# Patient Record
Sex: FEMALE | URBAN - METROPOLITAN AREA
[De-identification: names, ages, dates, MRNs, and addresses within clinical notes are randomized per-mention and may not be internally consistent; named-entity substitution may affect disease eponyms.]

---

## 2023-02-20 ENCOUNTER — HOSPITAL ENCOUNTER (OUTPATIENT)
Dept: TELEMEDICINE | Facility: HOSPITAL | Age: 68
Discharge: HOME | End: 2023-02-20

## 2023-02-20 DIAGNOSIS — R55 SYNCOPE, UNSPECIFIED SYNCOPE TYPE: ICD-10-CM

## 2023-02-20 PROCEDURE — G0425 PR INPT TELEHEALTH CONSULT 30M: ICD-10-PCS | Mod: GT,,, | Performed by: STUDENT IN AN ORGANIZED HEALTH CARE EDUCATION/TRAINING PROGRAM

## 2023-02-20 PROCEDURE — G0425 INPT/ED TELECONSULT30: HCPCS | Mod: GT,,, | Performed by: STUDENT IN AN ORGANIZED HEALTH CARE EDUCATION/TRAINING PROGRAM

## 2023-02-20 NOTE — ASSESSMENT & PLAN NOTE
"66 yo female w/ PMHx of R carotid dissection ( 20+ years ago) who presents w/ reported RUE and RLE drift.   Per report,  noted that patient had RUE "shaking" and confusion when he found her on the couch.   She states that prior to that she had an episode of LOC, unsure of how long she was out, while she was getting ready to go out.   Denies previous episodes such as this aside from the R carotid dissection history.   She is only taking vitamins at home.     NIHSS 0. Unable to review CTH myself as not in PACS.     Unclear etiology of syncopal episode at this time w/ subsequent RUE tremor and RUE and RLE weakness, that resolved by the time of the evaluation.     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    "

## 2023-02-20 NOTE — CONSULTS
"      [facility]  Vascular Neurology  Comprehensive Stroke Center  TeleVascular Neurology Acute Consultation Note      Consults    Consulting Provider: YOVANNY ROGERS  Current Providers  No providers found    [facility]  Spoke hospital nurse at bedside with patient assisting consultant.     Patient information was obtained from patient, spouse/SO, and ER records.         Assessment/Plan:       Diagnoses:   * Syncope  68 yo female w/ PMHx of R carotid dissection ( 20+ years ago) who presents w/ reported RUE and RLE drift.   Per report,  noted that patient had RUE "shaking" and confusion when he found her on the couch.   She states that prior to that she had an episode of LOC, unsure of how long she was out, while she was getting ready to go out.   Denies previous episodes such as this aside from the R carotid dissection history.   She is only taking vitamins at home.     NIHSS 0. Unable to review CTH myself as not in PACS.     Unclear etiology of syncopal episode at this time w/ subsequent RUE tremor and RUE and RLE weakness, that resolved by the time of the evaluation.     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          STROKE DOCUMENTATION     Acute Stroke Times:   Acute Stroke Times   Last Known Normal Date: 02/20/23  Last Known Normal Time: 1500  Symptom Onset Date: 02/20/23  Unknown Symptom Onset Time: Unknown Time  Stroke Team Called Date: 02/20/23  Stroke Team Called Time: 1707  Stroke Team Arrival Date: 02/20/23  Stroke Team Arrival Time: 1721  CT completed but images not available for review - spoke " "to document results:  (Unavailable for review in PACS)  Thrombolytic Therapy Recommended: No    NIH Scale:  1a. Level of Consciousness: 0-->Alert, keenly responsive  1b. LOC Questions: 0-->Answers both questions correctly  1c. LOC Commands: 0-->Performs both tasks correctly  2. Best Gaze: 0-->Normal  3. Visual: 0-->No visual loss  4. Facial Palsy: 0-->Normal symmetrical movements  5a. Motor Arm, Left: 0-->No drift, limb holds 90 (or 45) degrees for full 10 secs  5b. Motor Arm, Right: 0-->No drift, limb holds 90 (or 45) degrees for full 10 secs  6a. Motor Leg, Left: 0-->No drift, leg holds 30 degree position for full 5 secs  6b. Motor Leg, Right: 0-->No drift, leg holds 30 degree position for full 5 secs  7. Limb Ataxia: 0-->Absent  8. Sensory: 0-->Normal, no sensory loss  9. Best Language: 0-->No aphasia, normal  10. Dysarthria: 0-->Normal  11. Extinction and Inattention (formerly Neglect): 0-->No abnormality  Total (NIH Stroke Scale): 0     Modified Nury Score: 0  Octavio Coma Scale:    ABCD2 Score:    [id number] Score:   HAS -BLED Score:   ICH Score:   Hunt & Perez Classification:       There were no vitals taken for this visit.  Eligible for thrombolytic therapy?: Yes  Thrombolytic therapy recomended: Thrombolytic therapy not recommended due to Patient back to neurological baseline  Possible Interventional Revascularization Candidate? No; at this time symptoms not suggestive of large vessel occlusion    Disposition Recommendation: admit to inpatient    Subjective:     History of Present Illness:  68 yo female w/ PMHx of R carotid dissection ( 20+ years ago) who presents w/ reported RUE and RLE drift.   Per report,  noted that patient had RUE "shaking" and confusion when he found her on the couch.   She states that prior to that she had an episode of LOC, unsure of how long she was out, while she was getting ready to go out.   Denies previous episodes such as this aside from the R carotid dissection " history.   She is only taking vitamins at home.     LKW 15:00    /70  HR 86         Woke up with symptoms?: no    Recent bleeding noted: no  Does the patient take any Blood Thinners? no  Medications: No relevant medications      Past Medical History: R carotid dissection    Past Surgical History: no major surgeries within the last 2 weeks    Family History: no relevant history    Social History: no smoking, no drinking, no drugs    Allergies: Allergies have not been reviewed No relevant allergies    Review of Systems   Constitutional: Positive for fatigue.   HENT: Negative for trouble swallowing and voice change.    Eyes: Negative for visual disturbance.   Respiratory: Negative for shortness of breath.    Gastrointestinal: Negative for nausea and vomiting.   Endocrine: Negative.    Genitourinary: Negative.    Musculoskeletal: Negative.  Negative for gait problem.   Skin: Negative.    Neurological: Positive for tremors, syncope and weakness. Negative for dizziness, facial asymmetry, numbness and headaches.   Psychiatric/Behavioral: Positive for confusion.     Objective:   Vitals: There were no vitals taken for this visit.   /70  HR 86       CT READ: No    Physical Exam  Constitutional:       Appearance: Normal appearance.   HENT:      Head: Normocephalic and atraumatic.   Eyes:      Extraocular Movements: Extraocular movements intact.      Pupils: Pupils are equal, round, and reactive to light.   Cardiovascular:      Rate and Rhythm: Normal rate.   Pulmonary:      Effort: Pulmonary effort is normal.   Musculoskeletal:         General: Normal range of motion.      Cervical back: Normal range of motion.   Neurological:      General: No focal deficit present.      Mental Status: She is alert and oriented to person, place, and time. Mental status is at baseline.      Cranial Nerves: No cranial nerve deficit.      Sensory: No sensory deficit.      Motor: No weakness.      Coordination: Coordination  normal.             Recommended the emergency room physician to have a brief discussion with the patient and/or family if available regarding the  risks and benefits of treatment, and to briefly document the occurrence of that discussion in his clinical encounter note.     The attending portion of this evaluation, treatment, and documentation was performed per Aidee Montiel MD via audiovisual.    Billing code:  (non-intervention mild to moderate stroke, TIA, some mimics)      This patient has a critical neurological condition/illness, with some potential for high morbidity and mortality.  There is a moderate probability for acute neurological change leading to clinical and possibly life-threatening deterioration requiring highest level of physician preparedness for urgent intervention.  Care was coordinated with other physicians involved in the patient's care.  Radiologic studies and laboratory data were reviewed and interpreted, and plan of care was re-assessed based on the results.  Diagnosis, treatment options and prognosis may have been discussed with the patient and/or family members or caregiver.    In your opinion, this was a: Tier 2 Van Negative    Consult End Time: 5:54 PM     Aidee Montiel MD  [facility]  Vascular Neurology   [facility]

## 2023-02-20 NOTE — SUBJECTIVE & OBJECTIVE
Woke up with symptoms?: no    Recent bleeding noted: no  Does the patient take any Blood Thinners? no  Medications: No relevant medications      Past Medical History: R carotid dissection    Past Surgical History: no major surgeries within the last 2 weeks    Family History: no relevant history    Social History: no smoking, no drinking, no drugs    Allergies: Allergies have not been reviewed No relevant allergies    Review of Systems   Constitutional: Positive for fatigue.   HENT: Negative for trouble swallowing and voice change.    Eyes: Negative for visual disturbance.   Respiratory: Negative for shortness of breath.    Gastrointestinal: Negative for nausea and vomiting.   Endocrine: Negative.    Genitourinary: Negative.    Musculoskeletal: Negative.  Negative for gait problem.   Skin: Negative.    Neurological: Positive for tremors, syncope and weakness. Negative for dizziness, facial asymmetry, numbness and headaches.   Psychiatric/Behavioral: Positive for confusion.     Objective:   Vitals: There were no vitals taken for this visit.   /70  HR 86       CT READ: No    Physical Exam  Constitutional:       Appearance: Normal appearance.   HENT:      Head: Normocephalic and atraumatic.   Eyes:      Extraocular Movements: Extraocular movements intact.      Pupils: Pupils are equal, round, and reactive to light.   Cardiovascular:      Rate and Rhythm: Normal rate.   Pulmonary:      Effort: Pulmonary effort is normal.   Musculoskeletal:         General: Normal range of motion.      Cervical back: Normal range of motion.   Neurological:      General: No focal deficit present.      Mental Status: She is alert and oriented to person, place, and time. Mental status is at baseline.      Cranial Nerves: No cranial nerve deficit.      Sensory: No sensory deficit.      Motor: No weakness.      Coordination: Coordination normal.

## 2023-02-20 NOTE — HPI
"66 yo female w/ PMHx of R carotid dissection ( 20+ years ago) who presents w/ reported RUE and RLE drift.   Per report,  noted that patient had RUE "shaking" and confusion when he found her on the couch.   She states that prior to that she had an episode of LOC, unsure of how long she was out, while she was getting ready to go out.   Denies previous episodes such as this aside from the R carotid dissection history.   She is only taking vitamins at home.     LKW 15:00    /70  HR 86     "